# Patient Record
Sex: MALE | Race: WHITE
[De-identification: names, ages, dates, MRNs, and addresses within clinical notes are randomized per-mention and may not be internally consistent; named-entity substitution may affect disease eponyms.]

---

## 2019-11-26 LAB
ANION GAP SERPL CALC-SCNC: 13 MMOL/L (ref 8–16)
BASOPHILS # BLD AUTO: 0.1 10*3/UL (ref 0–0.1)
BASOPHILS NFR BLD AUTO: 1 % (ref 0–1)
BUN SERPL-MCNC: 22 MG/DL (ref 7–26)
BUN/CREAT SERPL: 18 (ref 6–25)
CALCIUM SERPL-MCNC: 10.5 MG/DL (ref 8.4–10.2)
CHLORIDE SERPL-SCNC: 102 MMOL/L (ref 98–107)
CO2 SERPL-SCNC: 29 MMOL/L (ref 22–29)
DEPRECATED NEUTROPHILS # BLD AUTO: 5.2 10*3/UL (ref 2.1–6.9)
EGFRCR SERPLBLD CKD-EPI 2021: > 60 ML/MIN (ref 60–?)
EOSINOPHIL # BLD AUTO: 0.3 10*3/UL (ref 0–0.4)
EOSINOPHIL NFR BLD AUTO: 4.5 % (ref 0–6)
ERYTHROCYTE [DISTWIDTH] IN CORD BLOOD: 12.6 % (ref 11.7–14.4)
GLUCOSE SERPLBLD-MCNC: 107 MG/DL (ref 74–118)
HCT VFR BLD AUTO: 45.5 % (ref 38.2–49.6)
HGB BLD-MCNC: 15.4 G/DL (ref 14–18)
LYMPHOCYTES # BLD: 1.2 10*3/UL (ref 1–3.2)
LYMPHOCYTES NFR BLD AUTO: 16.3 % (ref 18–39.1)
MCH RBC QN AUTO: 31.3 PG (ref 28–32)
MCHC RBC AUTO-ENTMCNC: 33.8 G/DL (ref 31–35)
MCV RBC AUTO: 92.5 FL (ref 81–99)
MONOCYTES # BLD AUTO: 0.5 10*3/UL (ref 0.2–0.8)
MONOCYTES NFR BLD AUTO: 6.6 % (ref 4.4–11.3)
NEUTS SEG NFR BLD AUTO: 71.2 % (ref 38.7–80)
PLATELET # BLD AUTO: 225 X10E3/UL (ref 140–360)
POTASSIUM SERPL-SCNC: 4 MMOL/L (ref 3.5–5.1)
RBC # BLD AUTO: 4.92 X10E6/UL (ref 4.3–5.7)
SODIUM SERPL-SCNC: 140 MMOL/L (ref 136–145)

## 2019-12-06 ENCOUNTER — HOSPITAL ENCOUNTER (OUTPATIENT)
Dept: HOSPITAL 88 - OR | Age: 54
Discharge: HOME | End: 2019-12-06
Attending: SURGERY
Payer: COMMERCIAL

## 2019-12-06 VITALS — SYSTOLIC BLOOD PRESSURE: 104 MMHG | DIASTOLIC BLOOD PRESSURE: 75 MMHG

## 2019-12-06 DIAGNOSIS — E11.9: ICD-10-CM

## 2019-12-06 DIAGNOSIS — I10: ICD-10-CM

## 2019-12-06 DIAGNOSIS — Z88.8: ICD-10-CM

## 2019-12-06 DIAGNOSIS — Z01.810: ICD-10-CM

## 2019-12-06 DIAGNOSIS — K40.90: Primary | ICD-10-CM

## 2019-12-06 DIAGNOSIS — D17.6: ICD-10-CM

## 2019-12-06 DIAGNOSIS — Z01.812: ICD-10-CM

## 2019-12-06 DIAGNOSIS — E78.5: ICD-10-CM

## 2019-12-06 PROCEDURE — 85025 COMPLETE CBC W/AUTO DIFF WBC: CPT

## 2019-12-06 PROCEDURE — 82948 REAGENT STRIP/BLOOD GLUCOSE: CPT

## 2019-12-06 PROCEDURE — 36415 COLL VENOUS BLD VENIPUNCTURE: CPT

## 2019-12-06 PROCEDURE — 80048 BASIC METABOLIC PNL TOTAL CA: CPT

## 2019-12-06 PROCEDURE — 49505 PRP I/HERN INIT REDUC >5 YR: CPT

## 2019-12-06 PROCEDURE — 93005 ELECTROCARDIOGRAM TRACING: CPT

## 2019-12-06 NOTE — OPERATIVE REPORT
DATE OF PROCEDURE:  12/06/2019

 

SURGEON:  Bartolome Gill MD

 

PREOPERATIVE DIAGNOSIS:  Right inguinal hernia.

 

POSTOPERATIVE DIAGNOSIS:  Right direct inguinal hernia.

 

OPERATION PERFORMED:  Repair of right direct inguinal hernia with large Prolene hernia

system. 

 

ANESTHESIA:  General.

 

COMPLICATIONS:  None.

 

ESTIMATED BLOOD LOSS:  Minimal.

 

DESCRIPTION OF PROCEDURE:  With the patient lying in bed in the supine position under

good general anesthesia, the abdomen was prepped with Betadine solution and draped in

the usual manner.  A right inguinal incision was made and was carried down through the

subcutaneous tissue down to the external oblique aponeurosis.  External oblique was then

opened along the length of its fibers and the external inguinal ring was opened.  The

cord was then mobilized and retracted.  Exploration of the cord revealed the presence of

a small lipoma of the cord which was  from the cord structures, ligated, and

divided.  The direct space had a large protruding direct hernia which was  from

all the structures and imbricated with a pursestring suture of 2-0 silk.  After this was

done, the preperitoneal space was then entered right through the internal ring and a

pocket was created without any difficulty.  A large Prolene hernia system was placed in

the preperitoneal space and the underlay patch was deployed without any problems.  The

overlay patch was then placed over the floor and split inferolaterally to allow for

passage of the cord.  The mesh was then sutured to the conjoined tendon and the inguinal

ligament using interrupted sutures of 2-0 Vicryl.  All layers then infiltrated on the

way out with solution of 0.25% Marcaine and 1% lidocaine mixed in equal parts.  The

external oblique aponeurosis was then closed with a running suture of 2-0 Vicryl,

subcutaneous tissue was approximated with 3-0 plain, and the skin was closed with clips.

 A dressing was applied.  The sponge, lap, and needle count was correct.  The patient

tolerated the procedure well and returned to the recovery room in stable condition. 

 

 

 

 

______________________________

MD SURENDRA العراقيR/MODL

D:  12/06/2019 10:25:45

T:  12/06/2019 17:49:28

Job #:  [id number]

## 2020-07-10 LAB
ANION GAP SERPL CALC-SCNC: 14.7 MMOL/L (ref 8–16)
BASOPHILS # BLD AUTO: 0.1 10*3/UL (ref 0–0.1)
BASOPHILS NFR BLD AUTO: 0.8 % (ref 0–1)
BUN SERPL-MCNC: 21 MG/DL (ref 7–26)
BUN/CREAT SERPL: 19 (ref 6–25)
CALCIUM SERPL-MCNC: 10 MG/DL (ref 8.4–10.2)
CHLORIDE SERPL-SCNC: 104 MMOL/L (ref 98–107)
CO2 SERPL-SCNC: 28 MMOL/L (ref 22–29)
DEPRECATED NEUTROPHILS # BLD AUTO: 3.9 10*3/UL (ref 2.1–6.9)
EGFRCR SERPLBLD CKD-EPI 2021: > 60 ML/MIN (ref 60–?)
EOSINOPHIL # BLD AUTO: 0.2 10*3/UL (ref 0–0.4)
EOSINOPHIL NFR BLD AUTO: 3.5 % (ref 0–6)
ERYTHROCYTE [DISTWIDTH] IN CORD BLOOD: 12.8 % (ref 11.7–14.4)
GLUCOSE SERPLBLD-MCNC: 118 MG/DL (ref 74–118)
HCT VFR BLD AUTO: 42.8 % (ref 38.2–49.6)
HGB BLD-MCNC: 14.5 G/DL (ref 14–18)
LYMPHOCYTES # BLD: 1.5 10*3/UL (ref 1–3.2)
LYMPHOCYTES NFR BLD AUTO: 24.8 % (ref 18–39.1)
MCH RBC QN AUTO: 30.8 PG (ref 28–32)
MCHC RBC AUTO-ENTMCNC: 33.9 G/DL (ref 31–35)
MCV RBC AUTO: 90.9 FL (ref 81–99)
MONOCYTES # BLD AUTO: 0.4 10*3/UL (ref 0.2–0.8)
MONOCYTES NFR BLD AUTO: 6.9 % (ref 4.4–11.3)
NEUTS SEG NFR BLD AUTO: 63.5 % (ref 38.7–80)
PLATELET # BLD AUTO: 209 X10E3/UL (ref 140–360)
POTASSIUM SERPL-SCNC: 3.7 MMOL/L (ref 3.5–5.1)
RBC # BLD AUTO: 4.71 X10E6/UL (ref 4.3–5.7)
SODIUM SERPL-SCNC: 143 MMOL/L (ref 136–145)

## 2020-07-15 ENCOUNTER — HOSPITAL ENCOUNTER (INPATIENT)
Dept: HOSPITAL 88 - OR | Age: 55
LOS: 3 days | Discharge: HOME | DRG: 713 | End: 2020-07-18
Attending: INTERNAL MEDICINE | Admitting: INTERNAL MEDICINE
Payer: COMMERCIAL

## 2020-07-15 VITALS — HEIGHT: 66 IN | WEIGHT: 164 LBS | BODY MASS INDEX: 26.36 KG/M2

## 2020-07-15 VITALS — DIASTOLIC BLOOD PRESSURE: 62 MMHG | SYSTOLIC BLOOD PRESSURE: 117 MMHG

## 2020-07-15 VITALS — DIASTOLIC BLOOD PRESSURE: 78 MMHG | SYSTOLIC BLOOD PRESSURE: 114 MMHG

## 2020-07-15 VITALS — SYSTOLIC BLOOD PRESSURE: 122 MMHG | DIASTOLIC BLOOD PRESSURE: 83 MMHG

## 2020-07-15 VITALS — SYSTOLIC BLOOD PRESSURE: 114 MMHG | DIASTOLIC BLOOD PRESSURE: 78 MMHG

## 2020-07-15 DIAGNOSIS — Z11.59: ICD-10-CM

## 2020-07-15 DIAGNOSIS — E78.5: ICD-10-CM

## 2020-07-15 DIAGNOSIS — N40.1: Primary | ICD-10-CM

## 2020-07-15 DIAGNOSIS — N39.0: ICD-10-CM

## 2020-07-15 DIAGNOSIS — R33.8: ICD-10-CM

## 2020-07-15 DIAGNOSIS — E29.1: ICD-10-CM

## 2020-07-15 DIAGNOSIS — N13.8: ICD-10-CM

## 2020-07-15 DIAGNOSIS — E11.65: ICD-10-CM

## 2020-07-15 LAB
ANION GAP SERPL CALC-SCNC: 11.3 MMOL/L (ref 8–16)
BASOPHILS # BLD AUTO: 0.1 10*3/UL (ref 0–0.1)
BASOPHILS NFR BLD AUTO: 0.5 % (ref 0–1)
BUN SERPL-MCNC: 17 MG/DL (ref 7–26)
BUN/CREAT SERPL: 16 (ref 6–25)
CALCIUM SERPL-MCNC: 9 MG/DL (ref 8.4–10.2)
CHLORIDE SERPL-SCNC: 106 MMOL/L (ref 98–107)
CO2 SERPL-SCNC: 26 MMOL/L (ref 22–29)
DEPRECATED NEUTROPHILS # BLD AUTO: 8.8 10*3/UL (ref 2.1–6.9)
EGFRCR SERPLBLD CKD-EPI 2021: > 60 ML/MIN (ref 60–?)
EOSINOPHIL # BLD AUTO: 0.1 10*3/UL (ref 0–0.4)
EOSINOPHIL NFR BLD AUTO: 0.9 % (ref 0–6)
ERYTHROCYTE [DISTWIDTH] IN CORD BLOOD: 12.7 % (ref 11.7–14.4)
GLUCOSE SERPLBLD-MCNC: 124 MG/DL (ref 74–118)
HCT VFR BLD AUTO: 42.9 % (ref 38.2–49.6)
HGB BLD-MCNC: 14.6 G/DL (ref 14–18)
LYMPHOCYTES # BLD: 1.3 10*3/UL (ref 1–3.2)
LYMPHOCYTES NFR BLD AUTO: 12 % (ref 18–39.1)
MCH RBC QN AUTO: 30.9 PG (ref 28–32)
MCHC RBC AUTO-ENTMCNC: 34 G/DL (ref 31–35)
MCV RBC AUTO: 90.9 FL (ref 81–99)
MONOCYTES # BLD AUTO: 0.3 10*3/UL (ref 0.2–0.8)
MONOCYTES NFR BLD AUTO: 2.5 % (ref 4.4–11.3)
NEUTS SEG NFR BLD AUTO: 83.7 % (ref 38.7–80)
PLATELET # BLD AUTO: 192 X10E3/UL (ref 140–360)
POTASSIUM SERPL-SCNC: 4.3 MMOL/L (ref 3.5–5.1)
RBC # BLD AUTO: 4.72 X10E6/UL (ref 4.3–5.7)
SODIUM SERPL-SCNC: 139 MMOL/L (ref 136–145)

## 2020-07-15 PROCEDURE — 0VB08ZZ EXCISION OF PROSTATE, VIA NATURAL OR ARTIFICIAL OPENING ENDOSCOPIC: ICD-10-PCS | Performed by: UROLOGY

## 2020-07-15 PROCEDURE — 82948 REAGENT STRIP/BLOOD GLUCOSE: CPT

## 2020-07-15 PROCEDURE — 93005 ELECTROCARDIOGRAM TRACING: CPT

## 2020-07-15 PROCEDURE — BT141ZZ FLUOROSCOPY OF KIDNEYS, URETERS AND BLADDER USING LOW OSMOLAR CONTRAST: ICD-10-PCS | Performed by: UROLOGY

## 2020-07-15 PROCEDURE — 80053 COMPREHEN METABOLIC PANEL: CPT

## 2020-07-15 PROCEDURE — 83735 ASSAY OF MAGNESIUM: CPT

## 2020-07-15 PROCEDURE — 36415 COLL VENOUS BLD VENIPUNCTURE: CPT

## 2020-07-15 PROCEDURE — 85025 COMPLETE CBC W/AUTO DIFF WBC: CPT

## 2020-07-15 PROCEDURE — 74420 UROGRAPHY RTRGR +-KUB: CPT

## 2020-07-15 PROCEDURE — 0T788ZZ DILATION OF BILATERAL URETERS, VIA NATURAL OR ARTIFICIAL OPENING ENDOSCOPIC: ICD-10-PCS | Performed by: UROLOGY

## 2020-07-15 PROCEDURE — 80048 BASIC METABOLIC PNL TOTAL CA: CPT

## 2020-07-15 RX ADMIN — POTASSIUM CHLORIDE AND SODIUM CHLORIDE SCH MLS/HR: 450; 150 INJECTION, SOLUTION INTRAVENOUS at 16:00

## 2020-07-15 RX ADMIN — DOCUSATE SODIUM SCH MG: 100 TABLET, FILM COATED ORAL at 17:00

## 2020-07-15 RX ADMIN — ACETAMINOPHEN PRN MG: 10 INJECTION, SOLUTION INTRAVENOUS at 21:16

## 2020-07-15 RX ADMIN — POTASSIUM CHLORIDE AND SODIUM CHLORIDE SCH MLS/HR: 450; 150 INJECTION, SOLUTION INTRAVENOUS at 19:00

## 2020-07-15 NOTE — NUR
RCD PT FROM PACU BY BED PT IS ALERT AND ORIENTED VITALS CHECKED PT ON WATKINS  24 F WITH 
CONTINUOUS BLADDER  IRRIGATION ,NO SIGNS OF BLEEDING  CHERRY COLOR URINE  ,ADMISSION 
ASSESSMENT AND HISTORY DONE ,INSTRUCTED THE PATIENT REGARDING HOSPITAL POLICY ,SPECIALLY 
VISITING POLICY BED LOW AND LOCKED CALL LIGHT IN REACH

## 2020-07-16 VITALS — DIASTOLIC BLOOD PRESSURE: 82 MMHG | SYSTOLIC BLOOD PRESSURE: 119 MMHG

## 2020-07-16 VITALS — SYSTOLIC BLOOD PRESSURE: 112 MMHG | DIASTOLIC BLOOD PRESSURE: 81 MMHG

## 2020-07-16 VITALS — DIASTOLIC BLOOD PRESSURE: 81 MMHG | SYSTOLIC BLOOD PRESSURE: 112 MMHG

## 2020-07-16 VITALS — SYSTOLIC BLOOD PRESSURE: 120 MMHG | DIASTOLIC BLOOD PRESSURE: 87 MMHG

## 2020-07-16 VITALS — DIASTOLIC BLOOD PRESSURE: 83 MMHG | SYSTOLIC BLOOD PRESSURE: 123 MMHG

## 2020-07-16 VITALS — SYSTOLIC BLOOD PRESSURE: 108 MMHG | DIASTOLIC BLOOD PRESSURE: 66 MMHG

## 2020-07-16 VITALS — DIASTOLIC BLOOD PRESSURE: 87 MMHG | SYSTOLIC BLOOD PRESSURE: 120 MMHG

## 2020-07-16 VITALS — DIASTOLIC BLOOD PRESSURE: 79 MMHG | SYSTOLIC BLOOD PRESSURE: 108 MMHG

## 2020-07-16 LAB
ANION GAP SERPL CALC-SCNC: 11.3 MMOL/L (ref 8–16)
BASOPHILS # BLD AUTO: 0 10*3/UL (ref 0–0.1)
BASOPHILS NFR BLD AUTO: 0.1 % (ref 0–1)
BUN SERPL-MCNC: 17 MG/DL (ref 7–26)
BUN/CREAT SERPL: 18 (ref 6–25)
CALCIUM SERPL-MCNC: 8.5 MG/DL (ref 8.4–10.2)
CHLORIDE SERPL-SCNC: 107 MMOL/L (ref 98–107)
CO2 SERPL-SCNC: 24 MMOL/L (ref 22–29)
DEPRECATED NEUTROPHILS # BLD AUTO: 11.4 10*3/UL (ref 2.1–6.9)
EGFRCR SERPLBLD CKD-EPI 2021: > 60 ML/MIN (ref 60–?)
EOSINOPHIL # BLD AUTO: 0 10*3/UL (ref 0–0.4)
EOSINOPHIL NFR BLD AUTO: 0.1 % (ref 0–6)
ERYTHROCYTE [DISTWIDTH] IN CORD BLOOD: 12.5 % (ref 11.7–14.4)
GLUCOSE SERPLBLD-MCNC: 130 MG/DL (ref 74–118)
HCT VFR BLD AUTO: 42.4 % (ref 38.2–49.6)
HGB BLD-MCNC: 14.2 G/DL (ref 14–18)
LYMPHOCYTES # BLD: 1.4 10*3/UL (ref 1–3.2)
LYMPHOCYTES NFR BLD AUTO: 9.8 % (ref 18–39.1)
MCH RBC QN AUTO: 30.7 PG (ref 28–32)
MCHC RBC AUTO-ENTMCNC: 33.5 G/DL (ref 31–35)
MCV RBC AUTO: 91.6 FL (ref 81–99)
MONOCYTES # BLD AUTO: 1.1 10*3/UL (ref 0.2–0.8)
MONOCYTES NFR BLD AUTO: 7.6 % (ref 4.4–11.3)
NEUTS SEG NFR BLD AUTO: 82 % (ref 38.7–80)
PLATELET # BLD AUTO: 221 X10E3/UL (ref 140–360)
POTASSIUM SERPL-SCNC: 4.3 MMOL/L (ref 3.5–5.1)
RBC # BLD AUTO: 4.63 X10E6/UL (ref 4.3–5.7)
SODIUM SERPL-SCNC: 138 MMOL/L (ref 136–145)

## 2020-07-16 RX ADMIN — ACETAMINOPHEN PRN MG: 10 INJECTION, SOLUTION INTRAVENOUS at 04:26

## 2020-07-16 RX ADMIN — POTASSIUM CHLORIDE AND SODIUM CHLORIDE SCH MLS/HR: 450; 150 INJECTION, SOLUTION INTRAVENOUS at 11:00

## 2020-07-16 RX ADMIN — TAMSULOSIN HYDROCHLORIDE SCH MG: 0.4 CAPSULE ORAL at 17:00

## 2020-07-16 RX ADMIN — AMLODIPINE BESYLATE SCH MG: 5 TABLET ORAL at 08:47

## 2020-07-16 RX ADMIN — Medication SCH MG: at 08:47

## 2020-07-16 RX ADMIN — TAMSULOSIN HYDROCHLORIDE SCH MG: 0.4 CAPSULE ORAL at 08:47

## 2020-07-16 RX ADMIN — POTASSIUM CHLORIDE AND SODIUM CHLORIDE SCH MLS/HR: 450; 150 INJECTION, SOLUTION INTRAVENOUS at 03:42

## 2020-07-16 RX ADMIN — CEFTRIAXONE SCH MLS/HR: 100 INJECTION, POWDER, FOR SOLUTION INTRAVENOUS at 08:46

## 2020-07-16 RX ADMIN — DOCUSATE SODIUM SCH MG: 100 TABLET, FILM COATED ORAL at 08:46

## 2020-07-16 RX ADMIN — POTASSIUM CHLORIDE AND SODIUM CHLORIDE SCH MLS/HR: 450; 150 INJECTION, SOLUTION INTRAVENOUS at 19:00

## 2020-07-16 RX ADMIN — DOCUSATE SODIUM SCH MG: 100 TABLET, FILM COATED ORAL at 17:00

## 2020-07-16 RX ADMIN — EZETIMIBE SCH MG: 10 TABLET ORAL at 08:47

## 2020-07-16 NOTE — NUR
BEDSIDE SHIFT REPORT RECEIVED FROM DAY RN. PT IS ALERT AND ORIENTED X3. RESPIRATIONS ARE 
EVEN AND UNLABORED. CBI CONTINUES. WATKINS DRAINING CLEAR YELLOW WITH OCCASSIONAL BLOOD TINGED 
URINE.URINE CLEAR WITH CBI.PT DENIES PAIN. PT WATCHING TV IN BED. CALL LIGHT WITHIN 
REACH.BED IN LOW POSITION.

## 2020-07-16 NOTE — NUR
RCD PT AT BED PT IS ALERT AND ORIENTED PT RESTING ON BED IV PATENT BY SALINE FLUSH  PT ON 
CBI ALMOST CLEAR BED LOW AND LOCKED CALL LIGHT IN REACH

## 2020-07-16 NOTE — CONSULTATION
DATE OF CONSULTATION:    

 

REASON FOR CONSULTATION:  Postop medical management.

 

HISTORY OF PRESENT ILLNESS:  The patient is a 54-year-old gentleman, status post TURP

for severe BPH, who is doing very well postoperatively with minimal hematuria in his

Rodriguez bag.  He states his pain is well controlled. 

 

PAST MEDICAL HISTORY:  Significant for BPH, hypertension, and hyperlipidemia.

 

MEDICATIONS:  See MAR.

 

ALLERGIES:  TO PHENERGAN.

 

SOCIAL:  .  Nonsmoker, nondrinker.

 

FAMILY HISTORY:  Noncontributory.

 

PHYSICAL EXAMINATION:

VITAL SIGNS:  Temperature is 98.0 pulse 80, blood pressure 108/66, and saturations 93%

on room air. 

GENERAL:  No apparent distress, lying in bed. 

NECK:  Supple. 

CARDIOVASCULAR:  Regular rate and rhythm. 

LUNGS:  Clear to auscultation bilaterally. 

ABDOMEN:  Good bowel sounds.  Soft and nontender. 

EXTREMITIES:  No clubbing or cyanosis. 

NEUROLOGIC:  Nonfocal. 

GENITOURINARY:  Has a Rodriguez intact with no clots noted and good color and of mostly

yellowish color. 

ASSESSMENT AND PLAN:  

1. Status post transurethral resection of the prostate.  Continue with postoperative

care per Dr. Espinosa. 

2. Urinary tract infection.  Continue with the antibiotics.

3. Elevated sugars.  Continue with current care monitoring.

4. Hypertension.  We will restart his home medicines.

5. Hyperlipidemia.  We will continue with his home medication.  Please see hospital

chart for full details. 

 

 

 

 

______________________________

MD QING Lynch/CRISTOBAL

D:  07/16/2020 04:37:30

T:  07/16/2020 04:59:04

Job #:  [id number]

## 2020-07-17 VITALS — DIASTOLIC BLOOD PRESSURE: 94 MMHG | SYSTOLIC BLOOD PRESSURE: 143 MMHG

## 2020-07-17 VITALS — SYSTOLIC BLOOD PRESSURE: 128 MMHG | DIASTOLIC BLOOD PRESSURE: 86 MMHG

## 2020-07-17 VITALS — DIASTOLIC BLOOD PRESSURE: 91 MMHG | SYSTOLIC BLOOD PRESSURE: 131 MMHG

## 2020-07-17 VITALS — SYSTOLIC BLOOD PRESSURE: 139 MMHG | DIASTOLIC BLOOD PRESSURE: 88 MMHG

## 2020-07-17 VITALS — DIASTOLIC BLOOD PRESSURE: 82 MMHG | SYSTOLIC BLOOD PRESSURE: 118 MMHG

## 2020-07-17 VITALS — DIASTOLIC BLOOD PRESSURE: 94 MMHG | SYSTOLIC BLOOD PRESSURE: 130 MMHG

## 2020-07-17 VITALS — SYSTOLIC BLOOD PRESSURE: 130 MMHG | DIASTOLIC BLOOD PRESSURE: 94 MMHG

## 2020-07-17 VITALS — DIASTOLIC BLOOD PRESSURE: 86 MMHG | SYSTOLIC BLOOD PRESSURE: 128 MMHG

## 2020-07-17 LAB
ALBUMIN SERPL-MCNC: 3.2 G/DL (ref 3.5–5)
ALBUMIN/GLOB SERPL: 1.1 {RATIO} (ref 0.8–2)
ALP SERPL-CCNC: 54 IU/L (ref 40–150)
ALT SERPL-CCNC: 25 IU/L (ref 0–55)
ANION GAP SERPL CALC-SCNC: 9.1 MMOL/L (ref 8–16)
BASOPHILS # BLD AUTO: 0.1 10*3/UL (ref 0–0.1)
BASOPHILS NFR BLD AUTO: 0.6 % (ref 0–1)
BUN SERPL-MCNC: 15 MG/DL (ref 7–26)
BUN/CREAT SERPL: 15 (ref 6–25)
CALCIUM SERPL-MCNC: 8.6 MG/DL (ref 8.4–10.2)
CHLORIDE SERPL-SCNC: 108 MMOL/L (ref 98–107)
CO2 SERPL-SCNC: 26 MMOL/L (ref 22–29)
DEPRECATED NEUTROPHILS # BLD AUTO: 6.7 10*3/UL (ref 2.1–6.9)
EGFRCR SERPLBLD CKD-EPI 2021: > 60 ML/MIN (ref 60–?)
EOSINOPHIL # BLD AUTO: 0.2 10*3/UL (ref 0–0.4)
EOSINOPHIL NFR BLD AUTO: 1.6 % (ref 0–6)
ERYTHROCYTE [DISTWIDTH] IN CORD BLOOD: 13 % (ref 11.7–14.4)
GLOBULIN PLAS-MCNC: 3 G/DL (ref 2.3–3.5)
GLUCOSE SERPLBLD-MCNC: 110 MG/DL (ref 74–118)
HCT VFR BLD AUTO: 43.3 % (ref 38.2–49.6)
HGB BLD-MCNC: 14.3 G/DL (ref 14–18)
LYMPHOCYTES # BLD: 2.1 10*3/UL (ref 1–3.2)
LYMPHOCYTES NFR BLD AUTO: 21 % (ref 18–39.1)
MAGNESIUM SERPL-MCNC: 1.9 MG/DL (ref 1.3–2.1)
MCH RBC QN AUTO: 30.8 PG (ref 28–32)
MCHC RBC AUTO-ENTMCNC: 33 G/DL (ref 31–35)
MCV RBC AUTO: 93.1 FL (ref 81–99)
MONOCYTES # BLD AUTO: 0.8 10*3/UL (ref 0.2–0.8)
MONOCYTES NFR BLD AUTO: 8 % (ref 4.4–11.3)
NEUTS SEG NFR BLD AUTO: 68.3 % (ref 38.7–80)
PLATELET # BLD AUTO: 189 X10E3/UL (ref 140–360)
POTASSIUM SERPL-SCNC: 4.1 MMOL/L (ref 3.5–5.1)
RBC # BLD AUTO: 4.65 X10E6/UL (ref 4.3–5.7)
SODIUM SERPL-SCNC: 139 MMOL/L (ref 136–145)

## 2020-07-17 RX ADMIN — Medication SCH MG: at 09:00

## 2020-07-17 RX ADMIN — DOCUSATE SODIUM SCH MG: 100 TABLET, FILM COATED ORAL at 09:00

## 2020-07-17 RX ADMIN — TAMSULOSIN HYDROCHLORIDE SCH MG: 0.4 CAPSULE ORAL at 17:19

## 2020-07-17 RX ADMIN — CEFTRIAXONE SCH MLS/HR: 100 INJECTION, POWDER, FOR SOLUTION INTRAVENOUS at 09:05

## 2020-07-17 RX ADMIN — AMLODIPINE BESYLATE SCH MG: 5 TABLET ORAL at 09:00

## 2020-07-17 RX ADMIN — POTASSIUM CHLORIDE AND SODIUM CHLORIDE SCH MLS/HR: 450; 150 INJECTION, SOLUTION INTRAVENOUS at 05:30

## 2020-07-17 RX ADMIN — EZETIMIBE SCH MG: 10 TABLET ORAL at 10:45

## 2020-07-17 RX ADMIN — TAMSULOSIN HYDROCHLORIDE SCH MG: 0.4 CAPSULE ORAL at 09:00

## 2020-07-17 RX ADMIN — POTASSIUM CHLORIDE AND SODIUM CHLORIDE SCH MLS/HR: 450; 150 INJECTION, SOLUTION INTRAVENOUS at 09:06

## 2020-07-17 RX ADMIN — POTASSIUM CHLORIDE AND SODIUM CHLORIDE SCH MLS/HR: 450; 150 INJECTION, SOLUTION INTRAVENOUS at 00:09

## 2020-07-17 RX ADMIN — DOCUSATE SODIUM SCH MG: 100 TABLET, FILM COATED ORAL at 17:19

## 2020-07-17 RX ADMIN — POTASSIUM CHLORIDE AND SODIUM CHLORIDE SCH MLS/HR: 450; 150 INJECTION, SOLUTION INTRAVENOUS at 20:15

## 2020-07-17 NOTE — NUR
PT IN BED SLEEPING NO DISTRESS NOTED ,NO S/S DISCOMFORT.BLADDER IRRIGATION IN PROCESS CLEAR 
YELLOW URINE,

## 2020-07-18 VITALS — SYSTOLIC BLOOD PRESSURE: 122 MMHG | DIASTOLIC BLOOD PRESSURE: 90 MMHG

## 2020-07-18 VITALS — DIASTOLIC BLOOD PRESSURE: 87 MMHG | SYSTOLIC BLOOD PRESSURE: 127 MMHG

## 2020-07-18 LAB
ANION GAP SERPL CALC-SCNC: 12.9 MMOL/L (ref 8–16)
BASOPHILS # BLD AUTO: 0.1 10*3/UL (ref 0–0.1)
BASOPHILS NFR BLD AUTO: 0.7 % (ref 0–1)
BUN SERPL-MCNC: 13 MG/DL (ref 7–26)
BUN/CREAT SERPL: 14 (ref 6–25)
CALCIUM SERPL-MCNC: 9.1 MG/DL (ref 8.4–10.2)
CHLORIDE SERPL-SCNC: 107 MMOL/L (ref 98–107)
CO2 SERPL-SCNC: 24 MMOL/L (ref 22–29)
DEPRECATED NEUTROPHILS # BLD AUTO: 4.3 10*3/UL (ref 2.1–6.9)
EGFRCR SERPLBLD CKD-EPI 2021: > 60 ML/MIN (ref 60–?)
EOSINOPHIL # BLD AUTO: 0.2 10*3/UL (ref 0–0.4)
EOSINOPHIL NFR BLD AUTO: 2.8 % (ref 0–6)
ERYTHROCYTE [DISTWIDTH] IN CORD BLOOD: 13.1 % (ref 11.7–14.4)
GLUCOSE SERPLBLD-MCNC: 109 MG/DL (ref 74–118)
HCT VFR BLD AUTO: 41.4 % (ref 38.2–49.6)
HGB BLD-MCNC: 14.1 G/DL (ref 14–18)
LYMPHOCYTES # BLD: 1.8 10*3/UL (ref 1–3.2)
LYMPHOCYTES NFR BLD AUTO: 26.1 % (ref 18–39.1)
MCH RBC QN AUTO: 31.8 PG (ref 28–32)
MCHC RBC AUTO-ENTMCNC: 34.1 G/DL (ref 31–35)
MCV RBC AUTO: 93.5 FL (ref 81–99)
MONOCYTES # BLD AUTO: 0.6 10*3/UL (ref 0.2–0.8)
MONOCYTES NFR BLD AUTO: 8.6 % (ref 4.4–11.3)
NEUTS SEG NFR BLD AUTO: 61.1 % (ref 38.7–80)
PLATELET # BLD AUTO: 180 X10E3/UL (ref 140–360)
POTASSIUM SERPL-SCNC: 3.9 MMOL/L (ref 3.5–5.1)
RBC # BLD AUTO: 4.43 X10E6/UL (ref 4.3–5.7)
SODIUM SERPL-SCNC: 140 MMOL/L (ref 136–145)

## 2020-07-18 RX ADMIN — POTASSIUM CHLORIDE AND SODIUM CHLORIDE SCH MLS/HR: 450; 150 INJECTION, SOLUTION INTRAVENOUS at 06:21

## 2020-07-18 NOTE — NUR
Patient received discharge order from Dr. Beltran. Patient had already been cleared by Dr. Espinosa and had prescriptions on the chart to go home with. Patient was given discharge 
paperwork, new prescriptions were given, patient stated he already had a follow up 
appointment with Dr. Espinosa, and he verbalized understanding on everything. Patient IV 
removed and covered with a C/D/I dressing. Patient wife called and he was assisted gathering 
his belongings. Patient awaiting wife so that he may discharge home. Will continue to 
monitor. 

-------------------------------------------------------------------------------

Addendum: 07/18/20 at 0817 by Mariama Streeter RN

-------------------------------------------------------------------------------

Patient wheeled to wife's car at 0810. No issues or complaints.

## 2020-07-19 NOTE — DISCHARGE SUMMARY
DISCHARGE DIAGNOSES:  Benign prostatic hypertrophy, prostatism, status post

transurethral resection of prostate. 

 

HISTORY OF PRESENT ILLNESS AND HOSPITAL COURSE:  The patient is a gentleman, who

presented with severe BPH, where he is now status post TURP by Dr. Espinosa without

complications.  He did significantly well very quickly with resolution of his hematuria

within 24 to 36 hours.  He was able to have his Rodriguez discontinued.  He was able to void

on his own without any difficulties and without any blood clots, so he was discharged

home once he was cleared by Dr. Espinosa with some p.o. antibiotics and to follow up in 1

to 2 weeks with Dr. Espinosa.  Please see hospital chart for full details. 

 

 

 

 

______________________________

MD QING Lynch/CRISTOBAL

D:  07/19/2020 07:42:24

T:  07/19/2020 07:53:13

Job #:  [id number]

## 2020-07-31 NOTE — XMS REPORT
Continuity of Care Document

                             Created on: 2020



MARIBELL FITZPATRICK EDADELINE

External Reference #: [id number]

: 1965

Sex: Male



Demographics





                          Address                   [address] [place]  [address]

 

                          Home Phone                [phone]

 

                          Preferred Language        Unknown

 

                          Marital Status            Unknown

 

                          Gnosticist Affiliation     Unknown

 

                          Race                      Unknown

 

                          Additional Race(s)        White



 

                          Ethnic Group              Unknown





Author





                          Author                    [facility]

t

 

                          Organization              [facility]

 

                          Address                   [address]

[place]  [address]



 

                          Phone                     Unavailable







Support





                Name            Relationship    Address         Phone

 

                    NANY FITZPATRICK  ECON                [address] [place]  [address]                     Unavailable







Care Team Providers





                    Care Team Member Name Role                Phone

 

                    MD LLOYD SALAMANCA MD PCP                 [phone]







Payers





           Payer Name Policy Type Policy Number Effective Date Expiration Date ALEJANDRINA [facility]             [id number] 2016 00:00:00            [facility]

 

           Cdc Review Covid19            [id number]                         [facility]







Problems





           Condition Name Condition Details Condition Category Status     Onset 

Date Resolution

Date            Last Treatment Date Treating Clinician Comments        Source

 

       Problem        Condition Active                                    [facility]







Allergies, Adverse Reactions, Alerts





        Allergy Name Allergy Type Status  Severity Reaction(s) Onset Date Inacti

ve Date 

Treating Clinician        Comments                  Source

 

       Promethazine Allergy to substance Active Moderate        2019 00:00

:00                      

[facility]







Social History





           Social Habit Start Date Stop Date  Quantity   Comments   Source

 

           Sex Assigned At Birth 1965 00:00:00 1965 00:00:00 Male   

               [facility]







Medications





             Ordered Medication Name Filled Medication Name Start Date   Stop Da

te    Current 

Medication? Ordering Clinician Indication Dosage     Frequency  Signature (SIG) 

Comments                  Components                Source

 

     Amlodipine Besylate Amlodipine Besylate           Yes            5         

Daily           [facility]

 

       Ezetimibe (Zetia) 10 Mg TABLET Ezetimibe (Zetia) 10 Mg TABLET            

   Yes                  10            

Daily                                                       [facility]

 

     Finasteride Finasteride           Yes            5         Daily           

[facility]

 

                          Losartan/Hydrochlorothiazide (Losartan-Hctz 100-25 Mg 

Tab) 1 Each TABLET 

Losartan/Hydrochlorothiazide (Losartan-Hctz 100-25 Mg Tab) 1 Each TABLET        

                                 Yes

                        1               Daily                   [facility]

 

           Tamsulosin Hcl (Flomax*) 0.4 Mg CAP Tamsulosin Hcl (Flomax*) 0.4 Mg C

AP                       Yes        

                    .4                  Twice A Day                     [facility]

 

                    Ciprofloxacin Hcl (Cipro) 500 Mg TABLET Ciprofloxacin Hcl (C

ipro) 500 Mg TABLET 

       2020-07-10 00:00:00 No                   500           Every 12 Hours    

           [facility]

 

                          Cephalexin Monohydrate (Keflex) 500 Mg CAPSULE Cephale

max Monohydrate (Keflex) 

500 Mg CAPSULE       2019 00:00:00 No                500         Three Gustavo

es A Day             [facility]

 

                          Docusate Calcium (Surfak) 240 Mg CAPSULE Docusate Calc

ium (Surfak) 240 Mg 

CAPSULE       2019 00:00:00 No                240         Twice A Day     

        [facility]

 

                          Hydrocodone Bit/Acetaminophen (Norco 7.5-325 Tablet) 1

 Each TABLET Hydrocodone 

Bit/Acetaminophen (Norco 7.5-325 Tablet) 1 Each TABLET                 2019

6 00:00:00 No              

                    1                   Every 4 Hours as needed for Mild Pain (1

-3) Or Fever>100.8                     [facility]

 

       Kenalog Ointment Kenalog Ointment        2019 00:00:00 No          

         1             Three Times A

Day                                                         [facility]







Vital Signs





             Vital Name   Observation Time Observation Value Comments     Source

 

             Body Temperature 2020 04:00:00 98.0 [degF]               [facility]

 

             BMI (Body Mass Index) 2020 00:29:00 26.5 kg/m2               

 [facility]

 

             Weight       2020-07-15 17:52:00 164 [lb_av]               [facility]







Procedures





                Procedure       Date / Time Performed Performing Clinician Sourtoi

e

 

                PRP I/ASHLEIGH INIT REDUC >5 YR 2019 00:00:00                 

[facility]







Plan of Care





             Planned Activity Planned Date Details      Comments     Source

 

             Instructions              Benign Prostatic Hypertrophy (BPH)       

       [facility]







Encounters





             Start Date/Time End Date/Time Encounter Type Admission Type Attendi

[facility] Facility   Care Department Encounter ID    Source

 

          2020-07-15 11:04:00 2020 08:10:00 Discharged Inpatient          

           [facility] [id number]              [facility]

 

          2019 05:25:00 2019 05:25:00 Registered Surgical Day Care  

                   [facility] [id number]              [facility]. [facility]

 

          2019 11:25:00 2019 15:18:00 Discharged Inpatient (obs)    

                 [facility]                    [id number]              [facility]. [facility]

 

          2019-06-10 05:49:00 2019-06-10 05:49:00 Registered Surgical Day Care  

                   [facility]                    [id number]              [facility]. [facility]







Results





           Test Description Test Time  Test Comments Results    Result Comments 

Source

 

                    Blood leukocytes automated count (number/volume) 2020 

05:12:00   

 

                                        Test Item

 

             White Blood Count (test code = 6690-2) 7.06         4.8-10.8       

            





[facility] count 
(number/volume)2020 05:12:00* 



             Test Item    Value        Reference Range Interpretation Comments

 

             Red Blood Count (test code = 789-8) 4.43         4.3-5.7           

         





[facility] measurement 
(moles/volume)2020 05:12:00* 



             Test Item    Value        Reference Range Interpretation Comments

 

             Hemoglobin (test code = 33425-3) 14.1         14.0-18.0            

      





[facility] blood hematocrit (volume 
fraction)2020 05:12:00* 



             Test Item    Value        Reference Range Interpretation Comments

 

             Hematocrit (test code = 4544-3) 41.4         38.2-49.6             

     





[facility] erythrocyte mean corpuscular 
yyvktt1375-02-02 05:12:00* 



             Test Item    Value        Reference Range Interpretation Comments

 

             Mean Corpuscular Volume (test code = 787-2) 93.5         81-99     

                 





[facility] erythrocyte mean corpuscular 
hemoglobin (mass per erythrocyte)2020 05:12:00* 



             Test Item    Value        Reference Range Interpretation Comments

 

             Mean Corpuscular Hemoglobin (test code = 785-6) 31.8         28-32 

                     





[facility] erythrocyte mean corpuscular 
hemoglobin concentration measurement (mass/volume)2020 05:12:00* 



             Test Item    Value        Reference Range Interpretation Comments

 

             Mean Corpuscular Hemoglobin Concent (test code = 786-4) 34.1       

  31-35                      





[facility] CampusRDW QwgMc-Yba2286-83-18 05:12:00* 



             Test Item    Value        Reference Range Interpretation Comments

 

             Red Cell Distribution Width (test code = 52691-4) 13.1         11.7

-14.4                  





[facility] blood platelet count 
(count/volume)2020 05:12:00* 



             Test Item    Value        Reference Range Interpretation Comments

 

             Platelet Count (test code = 777-3) 180          140-360            

        





[facility] CampusAutFormerly [facility] segmented neutrophil 
count as percentage of total iydayisnby1974-47-38 05:12:00* 



             Test Item    Value        Reference Range Interpretation Comments

 

             Neutrophils (%) (Auto) (test code = 08611-3) 61.1         38.7-80.0

                  





[facility] CampusAutom[facility] blood lymphocyte count as 
percentage ot total dpxmqksffy1509-59-31 05:12:00* 



             Test Item    Value        Reference Range Interpretation Comments

 

             Lymphocytes (%) (Auto) (test code = 736-9) 26.1         18.0-39.1  

                





[facility] Campus[facility]ated blood monocyte count as 
percentage of total roxdhhfhha3266-97-45 05:12:00* 



             Test Item    Value        Reference Range Interpretation Comments

 

             Monocytes (%) (Auto) (test code = 5905-5) 8.6          4.4-11.3    

               





[facility] Campus[facility] blood eosinophil count as 
percentage of total eghcnqtqpw1307-42-60 05:12:00* 



             Test Item    Value        Reference Range Interpretation Comments

 

             Eosinophils (%) (Auto) (test code = 713-8) 2.8          0.0-6.0    

                





[facility] CampusAutomated blood basophil count as 
percentage of total ldiaeudmfd8727-82-73 05:12:00* 



             Test Item    Value        Reference Range Interpretation Comments

 

             Basophils (%) (Auto) (test code = 706-2) 0.7          0.0-1.0      

              





[facility] CampusFluoroscopic procedure less than one hour
 atarnkqs0647-62-79 05:12:00* 



             Test Item    Value        Reference Range Interpretation Comments

 

             IM GRANULOCYTES % (test code = IM GRANULOCYTES %) 0.7          0.0-

1.0                    





[facility] Campus[facility] blood neutrophil count
2020 05:12:00* 



             Test Item    Value        Reference Range Interpretation Comments

 

             Neutrophils # (Auto) (test code = 751-8) 4.3          2.1-6.9      

              





[facility] lymphocytes count (number/volume)
2020 05:12:00* 



             Test Item    Value        Reference Range Interpretation Comments

 

             Lymphocytes # (Auto) (test code = 69249-9) 1.8          1.0-3.2    

                





[facility] Campus[facility] monocytes automated count 
(number/volume)2020 05:12:00* 



             Test Item    Value        Reference Range Interpretation Comments

 

             Monocytes # (Auto) (test code = 742-7) 0.6          0.2-0.8        

            





[facility] CampusAutomated blood eosinophil count
2020 05:12:00* 



             Test Item    Value        Reference Range Interpretation Comments

 

             Eosinophils # (Auto) (test code = 711-2) 0.2          0.0-0.4      

              





[facility] CampusAutomated blood basophil count 
(count/volume)2020 05:12:00* 



             Test Item    Value        Reference Range Interpretation Comments

 

             Basophils # (Auto) (test code = 704-7) 0.1          0.0-0.1        

            





[facility] CampusFluoroscopic procedure less than one hour
 gzcivqws5589-94-13 05:12:00* 



             Test Item    Value        Reference Range Interpretation Comments

 

                                        Absolute Immature Granulocyte (auto (lesley

t code = Absolute Immature Granulocyte 

(auto)          0.05            0-0.1                            





[facility] Camp[facility]um or plasma sodium measurement 
(moles/volume)2020 05:12:00* 



             Test Item    Value        Reference Range Interpretation Comments

 

             Sodium Level (test code = 2951-2) 140          136-145             

       





[facility] Campuserum or plasma potassium measurement 
(moles/volume)2020 05:12:00* 



             Test Item    Value        Reference Range Interpretation Comments

 

             Potassium Level (test code = 2823-3) 3.9          3.5-5.1          

          





[facility] Campuserum or plasma chloride measurement 
(moles/volume)2020 05:12:00* 



             Test Item    Value        Reference Range Interpretation Comments

 

             Chloride Level (test code = 2075-0) 107                      

         





[facility] Campuserum or plasma carbon dioxide, total 
measurement (moles/volume)2020 05:12:00* 



             Test Item    Value        Reference Range Interpretation Comments

 

             Carbon Dioxide Level (test code = 2028-9) 24           22-29       

               





[facility] Campuserum or plasma anion zsr8473-01-03 
05:12:00* 



             Test Item    Value        Reference Range Interpretation Comments

 

             Anion Gap (test code = 33037-3) 12.9         8-16                  

     





[facility] Campuserum or plasma urea nitrogen measurement
 (mass/volume)2020 05:12:00* 



             Test Item    Value        Reference Range Interpretation Comments

 

             Blood Urea Nitrogen (test code = 3094-0) 13           7-26         

              





[facility] Camp[facility]um or plasma creatinine measurement 
(mass/volume)2020 05:12:00* 



             Test Item    Value        Reference Range Interpretation Comments

 

             Creatinine (test code = 2160-0) 0.90         0.72-1.25             

     





[facility] Campuserum or plasma urea nitrogen/creatinine 
mass punly3087-04-74 05:12:00* 



             Test Item    Value        Reference Range Interpretation Comments

 

             BUN/Creatinine Ratio (test code = 3097-3) 14           6-25        

               





[facility] CampusEstimated glomerular filtration rate 
(GFR) expbutcuvgomz2306-07-10 05:12:00* 



             Test Item    Value        Reference Range Interpretation Comments

 

             Estimat Glomerular Filtration Rate (test code = 532559100) > 60    

     >60                        





Ranges were taken from the National Kidney Disease Education Program and the Jud
[facility] [employer] literature.Reference ranges:60 or greater: Azhzqj81-76 (
for 3 consecutive months): Chronic kidney disease 15 or less: Kidney failureThe Hospitals of Providence Horizon City CampusGlucose aahtvnrbvxe4965-05-70 05:12:00* 



             Test Item    Value        Reference Range Interpretation Comments

 

             Glucose Level (test code = HQM7484) 109                      

         





[facility] Campuserum or plasma calcium measurement 
(mass/volume)2020 05:12:00* 



             Test Item    Value        Reference Range Interpretation Comments

 

             Calcium Level (test code = 65916-8) 9.1          8.4-10.2          

         





[facility] CampusCapillary blood glucose measurement by 
glucometer (mass/volume)2020 19:26:00* 



             Test Item    Value        Reference Range Interpretation Comments

 

             Bedside Glucose (test code = 44909-5) 120                    

           





Meter ID: [id number] [facility] of [facility] Campuserum or plasma 
magnesium measurement (mass/volume)2020 04:40:00* 



             Test Item    Value        Reference Range Interpretation Comments

 

             Magnesium Level (test code = 64343-4) 1.9          1.3-2.1         

           





[facility] Campuserum or plasma total bilirubin 
measurement (mass/volume)2020 04:40:00* 



             Test Item    Value        Reference Range Interpretation Comments

 

             Total Bilirubin (test code = 1975-2) 0.3          0.2-1.2          

          





[facility] CampusFluoroscopic procedure less than one hour
 axtktecs6510-05-45 04:40:00* 



             Test Item    Value        Reference Range Interpretation Comments

 

                                        Aspartate Amino Transf (AST/SGOT) (test 

code = Aspartate Amino Transf 

(AST/SGOT))     16              5-34                             





[facility] Campuserum or plasma alanine aminotransferase 
measurement (enzymatic activity/volume)2020 04:40:00* 



             Test Item    Value        Reference Range Interpretation Comments

 

             Alanine Aminotransferase (ALT/SGPT) (test code = 1742-6) 25        

   0-55                       





[facility]um or plasma protein measurement 
(mass/volume)2020 04:40:00* 



             Test Item    Value        Reference Range Interpretation Comments

 

             Total Protein (test code = 2885-2) 6.2          6.5-8.1            

        





[facility] Campuserum or plasma albumin measurement 
(mass/volume)2020 04:40:00* 



             Test Item    Value        Reference Range Interpretation Comments

 

             Albumin (test code = 1751-7) 3.2          3.5-5.0                  

  





[facility] CampusPlasma globulin measurement (mass/volume)
2020 04:40:00* 



             Test Item    Value        Reference Range Interpretation Comments

 

             Globulin (test code = 33706-6) 3.0          2.3-3.5                

    





[facility] Camp[facility]um or plasma albumin/globulin mass 
fnnys1118-02-82 04:40:00* 



             Test Item    Value        Reference Range Interpretation Comments

 

             Albumin/Globulin Ratio (test code = 1759-0) 1.1          0.8-2.0   

                 





[facility] Campuserum or plasma alkaline phosphatase 
measurement (enzymatic activity/volume)2020 04:40:00* 



             Test Item    Value        Reference Range Interpretation Comments

 

             Alkaline Phosphatase (test code = 6768-6) 54                 

               





[facility] CampusFluoroscopic procedure less than one hour
 duration2020-07-10 16:52:00* 



             Test Item    Value        Reference Range Interpretation Comments

 

             Coronavirus (PCR) (test code = Coronavirus (PCR)) NOT DETECTED NOTD

ETECTED                





[employer] Aptima SARS-CoV-2 assay is a nucleic amplification test intended for the
 qualitative detection of RNA from SARS-CoV-2 from nasopharyngeal (NP) specimens
. It is used under Emergency Use Authorization (EUA) by FDA.A positive result is
 indicative of the presence of SARS-CoV-2 RNA. Clinical correlation with patient
 history and other diagnostic information is necessary to determine patient infe
ction status.A negative (Not Detected) result does not preclude SARS-CoV-2 infec
tion. Clinical Correlation with patient history and other diagnostic information
 should be used in patient management decisions.Invalid: Unable to generate a va
lid result on this specimen. Please submit a new specimen for reprat testing oc 
clinically indicated.Tesing performed by:[facility] [address] [place] [address] [id number], Richard Damian MD, PhD
[facility] Campusodium Wsdab7782-90-38 05:40:00* 



             Test Item    Value        Reference Range Interpretation Comments

 

             Sodium Level (test code = 2951-2) 138          136-145             

       





[facility] CampusPotassium Fjqfk6111-98-48 05:40:00* 



             Test Item    Value        Reference Range Interpretation Comments

 

             Potassium Level (test code = 2823-3) 3.6          3.5-5.1          

          





[facility] CampusChloride Pbiex0801-37-37 05:40:00* 



             Test Item    Value        Reference Range Interpretation Comments

 

             Chloride Level (test code = 2075-0) 98                       

         





[facility] CampusCarbon Dioxide Rqpme1567-61-11 05:40:00* 



             Test Item    Value        Reference Range Interpretation Comments

 

             Carbon Dioxide Level (test code = 2028-9) 26           22-29       

               





[facility] CampusAnion Wli1142-65-11 05:40:00* 



             Test Item    Value        Reference Range Interpretation Comments

 

             Anion Gap (test code = 33037-3) 17.6         8-16         H        

     





[facility] CampusBlood Urea Dbyagkit1704-99-20 05:40:00* 



             Test Item    Value        Reference Range Interpretation Comments

 

             Blood Urea Nitrogen (test code = 3094-0) 14           7-26         

              





[facility] CampusCreatinine2019-06-20 05:40:00* 



             Test Item    Value        Reference Range Interpretation Comments

 

             Creatinine (test code = 2160-0) 1.35         0.72-1.25    H        

     





[facility] CampusBUN/Creatinine [id number] 05:40:00* 



             Test Item    Value        Reference Range Interpretation Comments

 

             BUN/Creatinine Ratio (test code = 3097-3) 10           6-25        

               





[facility] CampusEstimat Glomerular Filtration Rate
2019 05:40:00* 



             Test Item    Value        Reference Range Interpretation Comments

 

             Estimat Glomerular Filtration Rate (test code = 781222210) 55      

     >60          L             





Ranges were taken from the National Kidney Disease Education Program and the [employer]
[facility] [employer] [employer] literature.Reference ranges:60 or greater: [id number] (
for 3 consecutive months): Chronic kidney disease 15 or less: Kidney failureThe Hospitals of [facility] CampusGlucose Mksak8207-50-44 05:40:00* 



             Test Item    Value        Reference Range Interpretation Comments

 

             Glucose Level (test code = PER6482) 114                      

         





[facility] CampusCalcium [id number] 05:40:00* 



             Test Item    Value        Reference Range Interpretation Comments

 

             Calcium Level (test code = 48028-7) 9.3          8.4-10.2          

         





[facility] CampusWhite Blood [id number] 05:39:00* 



             Test Item    Value        Reference Range Interpretation Comments

 

             White Blood Count (test code = 6690-2) 9.89         4.8-10.8       

            





[facility] CampusRed Blood Mecjc5970-00-17 05:39:00* 



             Test Item    Value        Reference Range Interpretation Comments

 

             Red Blood Count (test code = 789-8) 4.31         4.3-5.7           

         





[facility] CampusHemoglobin2019-06-20 05:39:00* 



             Test Item    Value        Reference Range Interpretation Comments

 

             Hemoglobin (test code = 75517-2) 13.0         14.0-18.0    L       

      





[facility] CampusHematocrit2019-06-20 05:39:00* 



             Test Item    Value        Reference Range Interpretation Comments

 

             Hematocrit (test code = 4544-3) 36.8         38.2-49.6    L        

     





[facility] CampusMean Corpuscular [id number] 
05:39:00* 



             Test Item    Value        Reference Range Interpretation Comments

 

             Mean Corpuscular Volume (test code = 787-2) 85.4         81-99     

                 





[facility] CampusMean Corpuscular [id number] 
05:39:00* 



             Test Item    Value        Reference Range Interpretation Comments

 

             Mean Corpuscular Hemoglobin (test code = 785-6) 30.2         28-32 

                     





[facility] CampusMean Corpuscular Hemoglobin Concent
2019 05:39:00* 



             Test Item    Value        Reference Range Interpretation Comments

 

             Mean Corpuscular Hemoglobin Concent (test code = 786-4) 35.3       

  31-35        H             





[facility] CampusRed Cell Distribution [id number] 
05:39:00* 



             Test Item    Value        Reference Range Interpretation Comments

 

             Red Cell Distribution Width (test code = 44328-2) 13.4         11.7

-14.4                  





[facility] CampusPlatelet [id number] 05:39:00* 



             Test Item    Value        Reference Range Interpretation Comments

 

             Platelet Count (test code = 777-3) 234          140-360            

        





[facility] CampusNeutrophils (%) (Auto)2019 05:39:00
  * 



             Test Item    Value        Reference Range Interpretation Comments

 

             Neutrophils (%) (Auto) (test code = 45903-3) 82.1         38.7-80.0

    H             





[facility] CampusLymphocytes (%) (Auto)2019 05:39:00
  * 



             Test Item    Value        Reference Range Interpretation Comments

 

             Lymphocytes (%) (Auto) (test code = 736-9) 12.3         18.0-39.1  

  L             





[facility] CampusMonocytes (%) (Auto)2019 05:39:00* 



             Test Item    Value        Reference Range Interpretation Comments

 

             Monocytes (%) (Auto) (test code = 5905-5) 5.1          4.4-11.3    

               





[facility] CampusEosinophils (%) (Auto)2019 05:39:00
  * 



             Test Item    Value        Reference Range Interpretation Comments

 

             Eosinophils (%) (Auto) (test code = 713-8) 0.0          0.0-6.0    

                





[facility] CampusBasophils (%) (Auto)2019 05:39:00* 



             Test Item    Value        Reference Range Interpretation Comments

 

             Basophils (%) (Auto) (test code = 706-2) 0.1          0.0-1.0      

              





[facility] CampusIM GRANULOCYTES %2019 05:39:00* 



             Test Item    Value        Reference Range Interpretation Comments

 

             IM GRANULOCYTES % (test code = IM GRANULOCYTES %) 0.4          0.0-

1.0                    





[facility] CampusNeutrophils # (Auto)2019 05:39:00* 



             Test Item    Value        Reference Range Interpretation Comments

 

             Neutrophils # (Auto) (test code = 751-8) 8.1          2.1-6.9      

H             





[facility] CampusLymphocytes # (Auto)2019 05:39:00* 



             Test Item    Value        Reference Range Interpretation Comments

 

             Lymphocytes # (Auto) (test code = 98672-8) 1.2          1.0-3.2    

                





[facility] CampusMonocytes # (Auto)2019 05:39:00* 



             Test Item    Value        Reference Range Interpretation Comments

 

             Monocytes # (Auto) (test code = 742-7) 0.5          0.2-0.8        

            





[facility] CampusEosinophils # (Auto)2019 05:39:00* 



             Test Item    Value        Reference Range Interpretation Comments

 

             Eosinophils # (Auto) (test code = 711-2) 0.0          0.0-0.4      

              





[facility] CampusBasophils # (Auto)2019 05:39:00* 



             Test Item    Value        Reference Range Interpretation Comments

 

             Basophils # (Auto) (test code = 704-7) 0.0          0.0-0.1        

            





[facility] CampusAbsolute Immature Granulocyte (auto
2019 05:39:00* 



             Test Item    Value        Reference Range Interpretation Comments

 

                                        Absolute Immature Granulocyte (auto (lesley

t code = Absolute Immature Granulocyte 

(auto)          0.04            0-0.1                            





[facility] CampusBedside [id number] 11:39:00* 



             Test Item    Value        Reference Range Interpretation Comments

 

             Bedside Glucose (test code = 77241-5) 101                    

           





Meter ID: [id number] Hospitals of [facility]

## 2020-08-03 NOTE — OPERATIVE REPORT
DATE OF PROCEDURE:  07/15/2020

 

SURGEON:  Jimbo Espinosa MD

 

PREOPERATIVE DIAGNOSES:  

1. Obstructive benign prostatic hyperplasia.

2. Incomplete bladder emptying.

 

POSTOPERATIVE DIAGNOSES:  

1. Obstructive benign prostatic hyperplasia.

2. Incomplete bladder emptying.

 

OPERATIONS PERFORMED:  

1. Cystourethroscopy with bilateral ureteral catheterization and retrograde

ureteropyelography (separate procedure performed for the incomplete bladder emptying). 

2. Interpretation of retrograde ureteropyelography.

3. Supervision of fluoroscopy, no radiologist present.

4. Cystourethroscopy with transurethral resection of the prostate utilizing the plasma

button electrode (separate procedure performed for the diagnosis of the obstructive

benign prostatic hyperplasia). 

 

ANESTHESIA:  General.

 

COMPLICATIONS:  None.

 

CLINICAL SUMMARY:  Héctor Kerns is a 54-year-old man with obstructive BPH.  He has

been on medication.  He has elected to proceed with surgery as planned.  He is aware of

the risks of bleeding, infection, injury to adjacent structures, need for additional

procedures and elected to proceed. 

 

PROCEDURE IN DETAIL:  Informed consent was verified.  Héctor Kerns was properly

identified, taken to the operating room, placed on the cystoscopy table in supine

position.  Anesthesia was uneventfully begun.  The patient was then carefully and gently

repositioned in the dorsal lithotomy position with all pressure points well padded.  His

genitalia were prepared and draped in usual sterile fashion.  The cystoscope sheath with

the visual obturator in place was atraumatically inserted.  The patient's urethra was

guided unremarkable.  Distal urethra through normal sphincteric region through the

prostate bed, which was significant for bilobar prostatic hypertrophy with kissing

lateral lobes and severe visual obstruction.  We entered the patient's bladder.

Panendoscopy revealed trabeculations, but no tumors no stones, and no diverticula.

Normally positioned and configured ureteral orifices were identified.  An 8-Tamazight

catheter was used to cannulate each ureter and retrograde ureteropyelograms were

performed. 

 

Interpretation of retrograde ureteropyelography contrast was instilled in retrograde

fashion bilaterally.  There were no tumors, no stones, and no diverticula.  Unobstructed

drainage was observed bilaterally fluoroscopically. 

 

The cystoscope was withdrawn.  The resectoscope was atraumatically placed.  Plasma

button electrode was then utilized to vaporize the prostate from the bladder neck tube,

but never passed the verumontanum and down the surgical capsule.  Pinpoint

electrocautery was utilized to achieve hemostasis.  The resectoscope was withdrawn.

Rodriguez catheter was placed.  It was irrigated to and fro to ensure it worked properly.

It was placed in continuous irrigation with clear efflux.  Belladonna and Opium

suppositories were placed revealing a large smooth prostate without any nodules.  The

patient was then uneventfully reversed from anesthesia and taken to recovery room in

stable condition.  There were no complications to the procedure.  He tolerated the

procedure well.  Plans will be to proceed with routine postoperative care and ongoing

urological followup. 

 

 

 

 

______________________________

Jimbo Espinosa MD

 

OH/MODL

D:  08/03/2020 04:15:58

T:  08/03/2020 05:25:44

Job #:  [id number]

 

cc:            Yonatan Brunson MD